# Patient Record
Sex: MALE | Race: WHITE | URBAN - METROPOLITAN AREA
[De-identification: names, ages, dates, MRNs, and addresses within clinical notes are randomized per-mention and may not be internally consistent; named-entity substitution may affect disease eponyms.]

---

## 2019-03-07 ENCOUNTER — OFFICE VISIT (OUTPATIENT)
Dept: ORTHOPEDIC SURGERY | Age: 24
End: 2019-03-07

## 2019-03-07 VITALS
TEMPERATURE: 97.8 F | WEIGHT: 167 LBS | DIASTOLIC BLOOD PRESSURE: 63 MMHG | RESPIRATION RATE: 15 BRPM | SYSTOLIC BLOOD PRESSURE: 106 MMHG | HEART RATE: 78 BPM

## 2019-03-07 DIAGNOSIS — M25.561 CHRONIC PAIN OF RIGHT KNEE: Primary | ICD-10-CM

## 2019-03-07 DIAGNOSIS — G89.29 CHRONIC PAIN OF RIGHT KNEE: Primary | ICD-10-CM

## 2019-03-07 DIAGNOSIS — S86.911A MUSCLE STRAIN OF RIGHT KNEE, INITIAL ENCOUNTER: ICD-10-CM

## 2019-03-07 RX ORDER — MELOXICAM 15 MG/1
TABLET ORAL
Qty: 90 TAB | Refills: 0 | Status: SHIPPED | OUTPATIENT
Start: 2019-03-07

## 2019-03-07 NOTE — PROGRESS NOTES
[address] is a 21y.o. year old male comes in today as new patient for: right knee pain posterior    Patients symptoms have been present for 1.5-2 years. Pain level 2-3/10, It has worsened with walking and stairs and leg lifts at gym. Patient has tried:  PT for a month at [facility] w/ somewhat worsening Tx for tiht hamstring w/ drry needling and stim. It is described as pain after twist injury on skateboard. IMAGING: XR today small calcification posterior joint line per my review    History reviewed. No pertinent surgical history. Social History     Socioeconomic History    Marital status: UNKNOWN     Spouse name: Not on file    Number of children: Not on file    Years of education: Not on file    Highest education level: Not on file   Tobacco Use    Smoking status: Never Smoker    Smokeless tobacco: Never Used   Substance and Sexual Activity    Alcohol use: Yes     Comment: socially     Drug use: No        History reviewed. No pertinent past medical history. History reviewed. No pertinent family history. ROS:  Some swell, no bruise, numb. All other systems reviewed and negative aside from that written in the HPI. Objective:  /63   Pulse 78   Temp 97.8 °F (36.6 °C)   Resp 15   Wt 167 lb (75.8 kg)   GEN: Appears stated age in NAD. HEAD:  Normocephalic, atraumatic. NEURO:  Sensation intact light touch B/L lower extremities. MS:  LE Strength +5/5 bilateral .   right Knee:  No Effusion . Lachman negative. Valgus negative. Varus negative. negative joint line tenderness medial, lateral.  Aniyah negative. Posterior drawer negative. Noble compression test negative. Patellar apprehension negative. Patellar facet  negative tender to palpation medial no crepitance bilateral .  Pes anserine negative TTP bilateral   EXT: no clubbing/cyanosis. no edema. SKIN: Warm/dry without rash. HEENT: Conjunctiva/lids WNL. External canals/nares WNL.  Tongue midline. PERRL, EOMI. Hearing intact. NECK: Trachea midline. Supple, Full ROM. No thyromegaly. CARDIAC: No edema. LUNGS: Normal effort. ABD: Soft, no masses. No HSM. PSYCH: A+O x3. Appropriate judgment and insight. Assessment/Plan:     ICD-10-CM ICD-9-CM    1. Chronic pain of right knee M25.561 719.46 XR KNEE RT MAX 2 VWS    G89.29 338.29 MRI KNEE RT WO CONT      meloxicam (MOBIC) 15 mg tablet   2. Muscle strain of right knee, initial encounter S86.911A 844.9 MRI KNEE RT WO CONT      meloxicam (MOBIC) 15 mg tablet       Patient (or guardian if minor) verbalizes understanding of evaluation and plan. Will get MRI as singifcant difference in medial posterior knee bulk w/ bulge during toe raise at likely medial gastroc and no improvement >1.5 years. Mobic PRN and RTC after MRI to discuss.

## 2019-03-07 NOTE — PROGRESS NOTES
STEFAN: fell off micky board and twisted knee   DOI: ~ 2 years ago  Had gone to PT  Reports feeling of \"ball in back of knee\"    After Visit:  AVS reviewed: YES  HEP: N/A  Resources Provided: NO n/a  Patient questions/concerns answered: YES  Patient verbalized understanding of treatment plan: YES

## 2019-03-28 ENCOUNTER — TELEPHONE (OUTPATIENT)
Dept: ORTHOPEDIC SURGERY | Age: 24
End: 2019-03-28